# Patient Record
Sex: MALE | Race: WHITE | NOT HISPANIC OR LATINO | Employment: FULL TIME | URBAN - METROPOLITAN AREA
[De-identification: names, ages, dates, MRNs, and addresses within clinical notes are randomized per-mention and may not be internally consistent; named-entity substitution may affect disease eponyms.]

---

## 2018-02-18 ENCOUNTER — OFFICE VISIT - HEALTHEAST (OUTPATIENT)
Dept: FAMILY MEDICINE | Facility: CLINIC | Age: 28
End: 2018-02-18

## 2018-02-18 DIAGNOSIS — J02.0 STREP PHARYNGITIS: ICD-10-CM

## 2018-02-18 DIAGNOSIS — R07.0 THROAT PAIN: ICD-10-CM

## 2018-02-18 DIAGNOSIS — J10.1 INFLUENZA B: ICD-10-CM

## 2018-02-18 DIAGNOSIS — R50.9 FEVER: ICD-10-CM

## 2018-02-18 DIAGNOSIS — K29.70 GASTRITIS: ICD-10-CM

## 2018-02-18 LAB
DEPRECATED S PYO AG THROAT QL EIA: ABNORMAL
FLUAV AG SPEC QL IA: ABNORMAL
FLUBV AG SPEC QL IA: ABNORMAL

## 2018-02-18 RX ORDER — ACETAMINOPHEN 500 MG
500 TABLET ORAL EVERY 6 HOURS PRN
Status: SHIPPED | COMMUNITY
Start: 2018-02-18 | End: 2023-12-23

## 2018-02-18 RX ORDER — IBUPROFEN 200 MG
200 TABLET ORAL EVERY 6 HOURS PRN
Status: SHIPPED | COMMUNITY
Start: 2018-02-18 | End: 2023-12-23

## 2018-02-19 ENCOUNTER — AMBULATORY - HEALTHEAST (OUTPATIENT)
Dept: LAB | Facility: CLINIC | Age: 28
End: 2018-02-19

## 2018-02-19 DIAGNOSIS — K29.70 GASTRITIS: ICD-10-CM

## 2018-02-20 ENCOUNTER — COMMUNICATION - HEALTHEAST (OUTPATIENT)
Dept: FAMILY MEDICINE | Facility: CLINIC | Age: 28
End: 2018-02-20

## 2018-02-20 LAB
H PYLORI AG STL QL IA: NORMAL
REPORT STATUS: NORMAL
SPECIMEN DESCRIPTION: NORMAL

## 2018-02-21 ENCOUNTER — COMMUNICATION - HEALTHEAST (OUTPATIENT)
Dept: FAMILY MEDICINE | Facility: CLINIC | Age: 28
End: 2018-02-21

## 2018-04-18 ENCOUNTER — OFFICE VISIT - HEALTHEAST (OUTPATIENT)
Dept: FAMILY MEDICINE | Facility: CLINIC | Age: 28
End: 2018-04-18

## 2018-04-18 DIAGNOSIS — A04.8 H. PYLORI INFECTION: ICD-10-CM

## 2018-04-18 DIAGNOSIS — F41.8 DEPRESSION WITH ANXIETY: ICD-10-CM

## 2018-04-18 DIAGNOSIS — F43.10 PTSD (POST-TRAUMATIC STRESS DISORDER): ICD-10-CM

## 2018-04-18 DIAGNOSIS — R10.9 ABDOMINAL PAIN: ICD-10-CM

## 2018-06-07 ENCOUNTER — OFFICE VISIT - HEALTHEAST (OUTPATIENT)
Dept: FAMILY MEDICINE | Facility: CLINIC | Age: 28
End: 2018-06-07

## 2018-06-07 DIAGNOSIS — J10.1 INFLUENZA B: ICD-10-CM

## 2018-06-07 DIAGNOSIS — R11.0 NAUSEA: ICD-10-CM

## 2018-06-07 DIAGNOSIS — R50.9 FEVER: ICD-10-CM

## 2018-06-07 DIAGNOSIS — J02.0 STREP PHARYNGITIS: ICD-10-CM

## 2018-06-07 DIAGNOSIS — R07.0 THROAT PAIN: ICD-10-CM

## 2018-06-07 RX ORDER — ONDANSETRON 4 MG/1
4 TABLET, ORALLY DISINTEGRATING ORAL EVERY 8 HOURS PRN
Qty: 10 TABLET | Refills: 0 | Status: SHIPPED | OUTPATIENT
Start: 2018-06-07 | End: 2023-12-23

## 2018-12-11 ENCOUNTER — COMMUNICATION - HEALTHEAST (OUTPATIENT)
Dept: LAB | Facility: CLINIC | Age: 28
End: 2018-12-11

## 2018-12-11 ENCOUNTER — OFFICE VISIT - HEALTHEAST (OUTPATIENT)
Dept: FAMILY MEDICINE | Facility: CLINIC | Age: 28
End: 2018-12-11

## 2018-12-11 DIAGNOSIS — R10.32 LLQ ABDOMINAL PAIN: ICD-10-CM

## 2018-12-11 DIAGNOSIS — R30.9 PAIN WITH URINATION: ICD-10-CM

## 2018-12-11 DIAGNOSIS — R35.0 URINARY FREQUENCY: ICD-10-CM

## 2018-12-11 LAB
ALBUMIN UR-MCNC: NEGATIVE MG/DL
APPEARANCE UR: CLEAR
BACTERIA #/AREA URNS HPF: NORMAL HPF
BILIRUB UR QL STRIP: NEGATIVE
COLOR UR AUTO: YELLOW
GLUCOSE UR STRIP-MCNC: NEGATIVE MG/DL
HGB UR QL STRIP: NEGATIVE
KETONES UR STRIP-MCNC: NEGATIVE MG/DL
LEUKOCYTE ESTERASE UR QL STRIP: NEGATIVE
NITRATE UR QL: NEGATIVE
PH UR STRIP: 6 [PH] (ref 5–8)
RBC #/AREA URNS AUTO: NORMAL HPF
SP GR UR STRIP: >=1.03 (ref 1–1.03)
SQUAMOUS #/AREA URNS AUTO: NORMAL LPF
UROBILINOGEN UR STRIP-ACNC: NORMAL
WBC #/AREA URNS AUTO: NORMAL HPF

## 2018-12-12 ENCOUNTER — COMMUNICATION - HEALTHEAST (OUTPATIENT)
Dept: LAB | Facility: CLINIC | Age: 28
End: 2018-12-12

## 2018-12-17 ENCOUNTER — HOSPITAL ENCOUNTER (OUTPATIENT)
Dept: CT IMAGING | Facility: CLINIC | Age: 28
Discharge: HOME | End: 2018-12-17

## 2018-12-17 DIAGNOSIS — R10.32 LLQ ABDOMINAL PAIN: ICD-10-CM

## 2018-12-18 ENCOUNTER — RECORDS - HEALTHEAST (OUTPATIENT)
Dept: ADMINISTRATIVE | Facility: OTHER | Age: 28
End: 2018-12-18

## 2018-12-18 ENCOUNTER — COMMUNICATION - HEALTHEAST (OUTPATIENT)
Dept: FAMILY MEDICINE | Facility: CLINIC | Age: 28
End: 2018-12-18

## 2021-06-01 VITALS — WEIGHT: 172 LBS | BODY MASS INDEX: 24.33 KG/M2

## 2021-06-01 VITALS — WEIGHT: 172.2 LBS | BODY MASS INDEX: 24.36 KG/M2

## 2021-06-01 VITALS — BODY MASS INDEX: 23.53 KG/M2 | WEIGHT: 166.38 LBS

## 2021-06-16 PROBLEM — F41.8 DEPRESSION WITH ANXIETY: Status: ACTIVE | Noted: 2018-04-18

## 2021-06-16 PROBLEM — F43.10 PTSD (POST-TRAUMATIC STRESS DISORDER): Status: ACTIVE | Noted: 2018-04-18

## 2021-06-16 PROBLEM — A04.8 H. PYLORI INFECTION: Status: ACTIVE | Noted: 2018-04-18

## 2021-06-17 NOTE — PROGRESS NOTES
Assessment/Plan:        1. Depression with anxiety  2. PTSD (post-traumatic stress disorder)  - Ambulatory referral to Psychology    3. H. pylori infection  S/p treatment,   4. Abdominal pain  - Ambulatory referral to Gastroenterology           Subjective:    Patient ID:   Tim Santoyo is a 27 y.o. male comes in for follow-up evaluation of anxiety ongoing for years and has been on various meds prescribed in his teen years to include:  Fluoxetine   paroxetin  cymbalta  Seroquel.   prozac   He is been on Effexor for the longest.   He believes that the low-dose sedative meds were helping him enough to care of his anxiety and Ativan as being the most effective treatment  He feels to be more anxious and denies being much depressed and been having panic attacks, and getting the shakes.  Has been to the past psychiatric consultations  Life stressors have been contributing factor to his anxiety.    Other concerns  Ongoing abdominal pain with status post treatment for H. Pylori  He would like a referral to gastroenterology        allergies, current medications, past family history, past medical history, past social history, past surgical history and problem list.    Review of Systems  A complete 10 point review of systems was obtained and is negative other than what is stated in the HPI.           Objective:   /58 (Patient Site: Right Arm, Patient Position: Sitting, Cuff Size: Adult Regular)  Pulse 72  Temp 98.5  F (36.9  C) (Oral)   Wt 172 lb 3.2 oz (78.1 kg)  SpO2 97%  BMI 24.36 kg/m2      Physical Exam  General Appearance:    Alert, cooperative, no distress,    Throat:   Lips, mucosa, and tongue normal; teeth and gums normal   Neck:   Supple, symmetrical, trachea midline, no adenopathy;     thyroid:  no enlargement/tenderness/nodules;    Lungs:     Clear to auscultation bilaterally, respirations unlabored    Heart:    Regular rate and rhythm, S1 and S2 normal, no murmur, rub   or gallop   Abdomen:  Soft,  nontender, nondistended, normoactive bowel sounds with no rebound or guarding no palpable mass   Skin:   Skin color, texture, turgor normal, no rashes or lesions                Psych:  Mental status: Alert, oriented, thought content appropriate,                                              affect: normal, thought content exhibits logical connections

## 2021-06-22 NOTE — PROGRESS NOTES
Assessment/Plan:   1. Urinary frequency  2. Pain with urination  Negative urinalysis, patient refused STD testing.  - Urinalysis-UC if Indicated    3. LLQ abdominal pain  Differentials includes diverticulosis, diverticulitis,   - CT Abdomen Pelvis With Oral With IV Contrast; Future     2. Maintain adequate hydration  3. Follow up if symptoms not improving, and prn.     Subjective:   Tim Santoyo is a 28 y.o. male who complains of burning with urination and frequency for 4 days.  Patient also complains of abdominal pain. Patient denies back pain, congestion, cough, fever and headache.  Patient does not have a history of recurrent UTI.  Patient does not have a history of pyelonephritis.  The following portions of the patient's history were reviewed and updated as appropriate: allergies, current medications, past family history, past medical history, past social history, past surgical history and problem list.  Review of Systems  Pertinent items are noted in HPI.      Objective:      There were no vitals taken for this visit.  General: alert, appears stated age and cooperative   Abdomen: soft, non-tender, without masses or organomegaly, soft and tenderness moderate in the LLQ in the LLQ   Back: back muscles are full ROM   : defer exam     Laboratory:   Urine dipstick shows negative for all components.  .

## 2021-06-26 NOTE — PROGRESS NOTES
Progress Notes by Jose Brenner PA-C at 6/7/2018  8:30 AM     Author: Jose Brenner PA-C Service: -- Author Type: Physician Assistant    Filed: 6/7/2018 10:56 AM Encounter Date: 6/7/2018 Status: Signed    : Jose Brenner PA-C (Physician Assistant)          Assessment and Plan     Tim was seen today for poss flu.    Diagnoses and all orders for this visit:    Influenza B    Strep pharyngitis  -     amoxicillin (AMOXIL) 500 MG tablet; Take 1 tablet (500 mg total) by mouth 2 (two) times a day for 10 days.    Throat pain  -     Rapid Strep A Screen-Throat    Fever  -     Influenza A/B Rapid Test    Nausea  -     ondansetron disintegrating tablet 4 mg (ZOFRAN-ODT); Take 1 tablet (4 mg total) by mouth once.  -     ondansetron (ZOFRAN-ODT) 4 MG disintegrating tablet; Take 1 tablet (4 mg total) by mouth every 8 (eight) hours as needed for nausea.         HPI     Chief Complaint   Patient presents with   ? poss flu     1x day, admit fever, and nausea, sore throat . pt took 1000mg of tyelonl 2x hours ago. pt would like a doctor note for today visit        Tim Santoyo is a 27 y.o. male seen today for about 12 hours of fatigue, sore throat, odynophagia, nausea, myalgias, arthralgias, rhinorrhea, and nasal congestion. Denies CP, cough, dyspnea, abdominal pain. No rashes.       Current Outpatient Prescriptions:   ?  acetaminophen (TYLENOL) 500 MG tablet, Take 500 mg by mouth every 6 (six) hours as needed for pain., Disp: , Rfl:   ?  ibuprofen (ADVIL,MOTRIN) 200 MG tablet, Take 200 mg by mouth every 6 (six) hours as needed for pain., Disp: , Rfl:   ?  amoxicillin (AMOXIL) 500 MG tablet, Take 1 tablet (500 mg total) by mouth 2 (two) times a day for 10 days., Disp: 20 tablet, Rfl: 0  ?  ondansetron (ZOFRAN-ODT) 4 MG disintegrating tablet, Take 1 tablet (4 mg total) by mouth every 8 (eight) hours as needed for nausea., Disp: 10 tablet, Rfl: 0  No current facility-administered medications for this  visit.      Reviewed and updated: medical history, medications and allergies.     Review of Systems     General: Approximately 12 hours of fatigue, fever.  Cardiovascular: Denies chest pain, dyspnea on exertion, palpitations.  Respiratory: Acknowledges rhinorrhea, cough.  Denies dyspnea or wheezing.  GI: Acknowledges nausea without vomiting.  No diarrhea or constipation..  : Denies dysuria, polyuria.     Objective     Vitals:    06/07/18 0833   BP: 112/64   Pulse: 96   Resp: 14   Temp: (!) 102  F (38.9  C)   TempSrc: Oral   SpO2: 97%   Weight: 172 lb (78 kg)        Reviewed vital signs.  General: Appears calm, comfortable. Answers questions quickly and appropriately with clear speech. No apparent distress.  Skin: Pink, warm, dry.  HENT: Normocephalic, atraumatic. TMs and canals clear bilaterally. No lymphadenopathy.  Posterior oropharynx is erythematous without tonsillar hypertrophy or exudate.  Uvula is midline.  There is no peritonsillar or retropharyngeal swelling.  Neck: Supple, with tender anterior lymphadenopathy and without thyromegaly.  Cardiovascular: Regular rate and rhythm, clear S1/S2 without murmur, rub, or gallop.  Respiratory: Lung sounds are clear and equal bilaterally. Normal respiratory effort.  GI: Abdomen is soft, flat, nontender.  One episode of dry heaves during the visit today.  Neuro: Memory and cognition appear normal. Normal gait.  Psych: Mood and affect appear normal.     Results for orders placed or performed in visit on 06/07/18   Rapid Strep A Screen-Throat   Result Value Ref Range    Rapid Strep A Antigen Group A Strep detected (!) No Group A Strep detected, presumptive negative   Influenza A/B Rapid Test   Result Value Ref Range    Influenza  A, Rapid Antigen No Influenza A antigen detected No Influenza A antigen detected    Influenza B, Rapid Antigen Influenza B antigen detected (!) No Influenza B antigen detected          Medical Decision-Making     Tim is a fatigued appearing  27-year-old male who presents with approximately 12 hours of fever, chills, myalgias, nausea, and sore throat.  He is febrile and appropriately somewhat tachycardic.  He is not tachypneic or hypoxic.  Physical exam is remarkable for an erythematous posterior oropharynx, tender anterior lymphadenopathy.  Physical exam is otherwise unremarkable.  Rapid strep test was positive and rapid influenza testing was positive for influenza B.  Discussed the efficacy of oseltamivir along with side effects, he declined oseltamivir therapy which I believe is appropriate given that he is not in a high risk category.  Prescribed 10 days of amoxicillin for the strep pharyngitis.    Reviewed red flags that would trigger a prompt return to the clinic as noted below under patient instructions.  He expressed understanding of these directions and is in agreement with the plan.     Patient Instructions     Patient Instructions       The Flu (Influenza)     The virus that causes the flu spreads through the air in droplets when someone who has the flu coughs, sneezes, laughs, or talks.   The flu (influenza) is an infection that affects your respiratory tract. This tract is made up of your mouth, nose, and lungs, and the passages between them. Unlike a cold, the flu can make you very ill. And it can lead to pneumonia, a serious lung infection. The flu can have serious complications and even cause death.  Who is at risk for the flu?  Anyone can get the flu. But you are more likely to become infected if you:    Have a weakened immune system    Work in a healthcare setting where you may be exposed to flu germs    Live or work with someone who has the flu    Havent had an annual flu shot  How does the flu spread?  The flu is caused by a virus. The virus spreads through the air in droplets when someone who has the flu coughs, sneezes, laughs, or talks. You can become infected when you inhale these viruses directly. You can also become infected  when you touch a surface on which the droplets have landed and then transfer the germs to your eyes, nose, or mouth. Touching used tissues, or sharing utensils, drinking glasses, or a toothbrush from an infected person can expose you to flu viruses, too.  What are the symptoms of the flu?  Flu symptoms tend to come on quickly and may last a few days to a few weeks. They include:    Fever usually higher than 100.4 F  (38 C) and chills    Sore throat and headache    Dry cough    Runny nose    Tiredness and weakness    Muscle aches  Who is at risk for flu complications?  For some people, the flu can be very serious. The risk for complications is greater for:    Children younger than age 5    Adults ages 65 and older    People with a chronic illness such as diabetes or heart, kidney, or lung disease    People who live in a nursing home or long-term care facility   How is the flu treated?  The flu usually gets better after 7 days or so. In some cases, your healthcare provider may prescribe an antiviral medicine. This may help you get well a little sooner. For the medicine to help, you need to take it as soon as possible (ideally within 48 hours) after your symptoms start. If you develop pneumonia or other serious illness, you may need to stay in the hospital.  Easing flu symptoms    Drink lots of fluids such as water, juice, and warm soup. A good rule is to drink enough so that you urinate your normal amount.    Get plenty of rest.    Ask your healthcare provider what to take for fever and pain.    Call your provider if your fever is 100.4 F (38 C) or higher, or you become dizzy, lightheaded, or short of breath.  Taking steps to protect others    Wash your hands often, especially after coughing or sneezing. Or clean your hands with an alcohol-based hand  containing at least 60% alcohol.    Cough or sneeze into a tissue. Then throw the tissue away and wash your hands. If you dont have a tissue, cough and sneeze  into your elbow.    Stay home until at least 24 hours after you no longer have a fever or chills. Be sure the fever isnt being hidden by fever-reducing medicine.    Dont share food, utensils, drinking glasses, or a toothbrush with others.    Ask your healthcare provider if others in your household should get antiviral medicine to help them avoid infection.  How can the flu be prevented?    One of the best ways to avoid the flu is to get a flu vaccine each year. The virus that causes the flu changes from year to year. For that reason, healthcare providers recommend getting the flu vaccine each year, as soon as it's available in your area. The vaccine is given as a shot. Your healthcare provider can tell you which vaccine is right for you. The nasal spray is not recommended for the 6820-5381 flu season. The CDC says the nasal spray did not seem to protect against the flu over the last several flu seasons.    Wash your hands often. Frequent handwashing is a proven way to help prevent infection.    Carry an alcohol-based hand gel containing at least 60% alcohol. Use it when you can't use soap and water. Then wash your hands as soon as you can.    Avoid touching your eyes, nose, and mouth.    At home and work, clean phones, computer keyboards, and toys often with disinfectant wipes.    If possible, avoid close contact with others who have the flu or symptoms of the flu.  Handwashing tips  Handwashing is one of the best ways to prevent many common infections. If you are caring for or visiting someone with the flu, wash your hands each time you enter and leave the room. Follow these steps:    Use warm water and plenty of soap. Rub your hands together well.    Clean the whole hand, including under your nails, between your fingers, and up the wrists.    Wash for at least 15 seconds.    Rinse, letting the water run down your fingers, not up your wrists.    Dry your hands well. Use a paper towel to turn off the faucet and open  the door.  Using alcohol-based hand   Alcohol-based hand  are also a good choice. Use them when you can't use soap and water. Follow these steps:    Squeeze about a tablespoon of gel into the palm of one hand.    Rub your hands together briskly, cleaning the backs of your hands, the palms, between your fingers, and up the wrists.    Rub until the gel is gone and your hands are completely dry.  Preventing the flu in healthcare settings  The flu is a special concern for people in hospitals and long-term care facilities. To help prevent the spread of flu, many hospitals and nursing homes take these steps:    Healthcare providers wash their hands or use an alcohol-based hand  before and after treating each patient.    People with the flu have private rooms and bathrooms or share a room with someone with the same infection.    People who are at high risk for the flu but don't have it are encouraged to get the flu and pneumonia vaccines.    All healthcare workers are encouraged or required to get flu shots.   Date Last Reviewed: 12/1/2016 2000-2017 [employer]. [address] [place] [address]. All rights reserved. This information is not intended as a substitute for professional medical care. Always follow your healthcare professional's instructions.        Pharyngitis: Strep (Confirmed)    You have had a positive test for strep throat. Strep throat is a contagious illness. It is spread by coughing, kissing or by touching others after touching your mouth or nose. Symptoms include throat pain that is worse with swallowing, aching all over, headache, and fever. It is treated with antibiotic medicine. This should help you start to feel better in 1 to 2 days.  Home care    Rest at home. Drink plenty of fluids to you won't get dehydrated.    No work or school for the first 2 days of taking the antibiotics. After this time, you will not be contagious. You can then return to  school or work if you are feeling better.     Take antibiotic medicine for the full 10 days, even if you feel better. This is very important to ensure the infection is treated. It is also important to prevent medicine-resistant germs from developing. If you were given an antibiotic shot, you don't need any more antibiotics.    You may use acetaminophen or ibuprofen to control pain or fever, unless another medicine was prescribed for this. Talk with your healthcare provider before taking these medicines if you have chronic liver or kidney disease. Also talk with your healthcare provider if you have had a stomach ulcer or GI bleeding.    Throat lozenges or sprays help reduce pain. Gargling with warm saltwater will also reduce throat pain. Dissolve 1/2 teaspoon of salt in 1 glass of warm water. This may be useful just before meals.     Soft foods are OK. Don't eat salty or spicy foods.  Follow-up care  Follow up with your healthcare provider or our staff if you don't get better over the next week.  When to seek medical advice  Call your healthcare provider right away if any of these occur:    Fever of 100.4 F (38 C) or higher, or as directed by your healthcare provider    New or worsening ear pain, sinus pain, or headache    Painful lumps in the back of neck    Stiff neck    Lymph nodes getting larger or becoming soft in the middle    You can't swallow liquids or you can't open your mouth wide because of throat pain    Signs of dehydration. These include very dark urine or no urine, sunken eyes, and dizziness.    Trouble breathing or noisy breathing    Muffled voice    Rash  Prevention  Here are steps you can take to help prevent an infection:    Keep good hand washing habits.    Dont have close contact with people who have sore throats, colds, or other upper respiratory infections.    Dont smoke, and stay away from secondhand smoke.  Date Last Reviewed: 11/1/2017 2000-2017 [employer]. [address] [place] [address]  [facility], [place] [address]. All rights reserved. This information is not intended as a substitute for professional medical care. Always follow your healthcare professional's instructions.            Discussed benefit vs risk of medications, dosing, side effects.  Patient was able to verbalize understanding.  After visit summary was provided for patient.     Alejandro Brenner PA-C

## 2021-06-26 NOTE — PROGRESS NOTES
Progress Notes by Keyon Anderson DO at 2/18/2018  9:00 AM     Author: Keyon Anderson DO Service: -- Author Type: Physician    Filed: 2/19/2018  7:36 AM Encounter Date: 2/18/2018 Status: Signed    : Keyon Anderson DO (Physician)       Chief Complaint   Patient presents with   ? Fever     highest at 102.5, last treated with ibuprofen this morning   ? Sore Throat     started today     History of Present Illness: Nursing notes reviewed. Patient started feeling ill yesterday morning, with sweating, fever, body aches, and headaches. Another concern is a burning sensation in his stomach every morning until he eats foot. He was treated for H. Pylori about 3 years ago, and felt better until the burning stomach sensation started about 2 years ago. Antacid like omeprazole helps the burning sensation temporarily. He drinks a lot of coffee and spicy foods. No recent darker or bloody stools.     Review of systems: See history of present illness, otherwise negative.     Current Outpatient Prescriptions   Medication Sig Dispense Refill   ? acetaminophen (TYLENOL) 500 MG tablet Take 500 mg by mouth every 6 (six) hours as needed for pain.     ? ibuprofen (ADVIL,MOTRIN) 200 MG tablet Take 200 mg by mouth every 6 (six) hours as needed for pain.     ? amoxicillin (AMOXIL) 500 MG capsule Take 1 capsule (500 mg total) by mouth 2 (two) times a day for 10 days. 20 capsule 0   ? oseltamivir (TAMIFLU) 75 MG capsule Take 1 capsule (75 mg total) by mouth 2 (two) times a day for 5 days. 10 capsule 0     No current facility-administered medications for this visit.        Past Medical History:   Diagnosis Date   ? GERD (gastroesophageal reflux disease)     Over 3 years with history of H. pylori   ? Seizures     ×1 with syncope after a blood draw      Past Surgical History:   Procedure Laterality Date   ? Jaw reconstruction  2008      Social History     Social History   ? Marital status: Single     Spouse name: N/A   ? Number of children:  1   ? Years of education: 12     Occupational History   ? [employer]     Social History Main Topics   ? Smoking status: Former Smoker     Quit date: 3/21/2016   ? Smokeless tobacco: Never Used   ? Alcohol use None   ? Drug use: None   ? Sexual activity: Not Asked     Other Topics Concern   ? None     FMHx: mom has history of HTM, Dad has history of H. Pylori infection. Maternal grandma had history of stomach cancer.    Social History Narrative       History   Smoking Status   ? Former Smoker   ? Quit date: 3/21/2016   Smokeless Tobacco   ? Never Used      Exam:   Blood pressure 114/70, pulse 73, temperature 98.7  F (37.1  C), temperature source Oral, resp. rate 18, weight 166 lb 6 oz (75.5 kg), SpO2 97 %.    EXAM:   General: Vital signs reviewed. Patient is in no acute appearing distress with no guarding of movement during exam. Breathing is non labored appearing. Patient is alert and oriented x 3.   ENT: Tympanic membranes are clear and without injection bilaterally, nasal turbinates show no injection or rhinorrhea, mild pharyngeal injection without exudate noted.  Eyes: Sclera are normal white color, normal consensual gaze, corneas are clear.  Neck: supple with no adenopathy.  Heart: Normal rate and rhythm without murmur  Lungs: Clear to auscultation with good air flow bilaterally.  Heart: Normal rate and rhythm without murmur  Lungs: Clear to auscultation with good air flow bilaterally.  Abdomen soft, non tender, no abnormal masses. Normal bowel sounds.  Skin: warm and dry with no edema noted.  Neuro: No focal deficits noted.    Recent Results (from the past 24 hour(s))   Rapid Strep A Screen-Throat   Result Value Ref Range    Rapid Strep A Antigen Group A Strep detected (!) No Group A Strep detected, presumptive negative   Influenza A/B Rapid Test   Result Value Ref Range    Influenza  A, Rapid Antigen No Influenza A antigen detected No Influenza A antigen detected    Influenza B,  Rapid Antigen Influenza B antigen detected (!) No Influenza B antigen detected    Results from exam reviewed with patient.    Assessment/Plan   1. Gastritis  H. pylori Antigen, Stool    CANCELED: H. pylori Antigen, Stool   2. Fever  Rapid Strep A Screen-Throat    Influenza A/B Rapid Test   3. Throat pain     4. Influenza B  oseltamivir (TAMIFLU) 75 MG capsule   5. Strep pharyngitis  amoxicillin (AMOXIL) 500 MG capsule       Patient Instructions     Also see info below. We will notify you of the results of studies not known at time of exam, and treat appropriately. Be seen again in 2-3 days if symptoms are not better, sooner if feeling any worse. If the H. Pylori test is negative, further discussion about the cause of your stomach discomfort should be done through primary provider.    Understanding Gastritis    Gastritis is a painful inflammation of the stomach lining. It has a number of causes. Gastritis and its symptoms can be relieved with treatment. Work with your healthcare provider to find ways to treat your symptoms.  The Stomach  To digest the food you eat, your stomach makes strong acids and enzymes. A healthy stomach has built-in defenses that protect its lining from damage by these acids and enzymes.  When you have gastritis  Acids may damage the stomach lining when the built-in defenses of the stomach dont function as they should. The stomach lining can then become inflamed. When this happens, it is called gastritis.  Causes of gastritis  Gastritis has many causes. They may include:    Aspirin and anti-inflammatory medicines    Tobacco use    Alcohol use    Helicobacter pylori (H. pylori) bacteria    Trauma from injuries, burns, or major surgery    Critical illness or autoimmune disorders  Common symptoms  With gastritis, you may notice one or more of the following:    A burning feeling in your upper belly    Pain that happens after eating certain foods    Gas or a bloated feeling in your  stomach    Frequent belching    Nausea with or without vomiting    Loss of appetite    Feeling full quickly    Fatigue  Date Last Reviewed: 7/1/2016 2000-2016 [employer]. [address] [place] [address], [place] [address]. All rights reserved. This information is not intended as a substitute for professional medical care. Always follow your healthcare professional's instructions.        Treating Gastritis     Take your medicines as directed, even if your stomach pain goes away.    A medical evaluation will be done to find out the cause of your symptoms. The evaluation may include your health history, a physical exam, and some tests. Once your evaluation is done, treatment can begin. It may include taking certain medicines and making some lifestyle changes. Follow your healthcare providers advice.  Taking medicines  Your healthcare providers may prescribe some medicines to neutralize or reduce excess stomach acids. If tests show that H. pylori are in your stomach lining, antibiotics may be prescribed. H.pylori are a type of bacteria that can cause gastritis.  Avoiding certain things  Be sure to avoid:    Aspirin. Avoid taking aspirin and other anti-inflammatory medicines, such as ibuprofen. They can irritate your stomach lining. Also, check with your healthcare provider before taking or stopping any medicines.    Spicy foods and caffeine. Stay away from foods prepared with spices, especially black pepper. Caffeine can also make your symptoms worse. So, avoid coffee, tea, cola drinks, and chocolate. Be sure to tell your healthcare provider about any other foods or liquids that bother your stomach.    Tobacco and alcohol. Dont use tobacco or drink alcohol. Tobacco and alcohol can increase stomach acids and worsen your gastritis symptoms.  Reducing your stress  Stress may make your gastritis symptoms worse. Whenever you can, reduce the stress in your life. One way to do this is to start an exercise  program--talk to your healthcare provider first. Also, try to get enough sleep, at least 8 hours a night.  Date Last Reviewed: 7/1/2016 2000-2016 [employer]. [address] [place] [address], [place] [address]. All rights reserved. This information is not intended as a substitute for professional medical care. Always follow your healthcare professional's instructions.        Influenza     Viruses that cause influenza spread through the air in droplets when someone who has the flu coughs, sneezes, laughs, or talks.   Influenza (the flu) is an infection that affects your respiratory tract. This tract is made up of your mouth, nose, and lungs, and the passages between them. Unlike a cold, the flu can make you very ill. And it can lead to pneumonia, a serious lung infection. The flu can have serious complications and even be fatal for some people. These include older adults, young children, and people with certain chronic conditions.  Who is at risk for the flu?  Anyone can get the flu. But you are more likely to become infected if you:    Have a weakened immune system    Work in a healthcare setting where you may be exposed to flu germs    Live or work with someone who has the flu    Havent had an annual flu shot  How does the flu spread?  The flu is caused by viruses. The viruses spread through the air in droplets when someone who has the flu coughs, sneezes, laughs, or talks. You can become infected when you inhale these viruses directly. You can also become infected when you touch a surface on which the droplets have landed and then transfer the germs to your eyes, nose, or mouth. Touching used tissues, or sharing utensils, drinking glasses, or a toothbrush with an infected person can expose you to flu viruses, too.  What are the symptoms of the flu?  Flu symptoms tend to come on quickly and may last a few days to a few weeks. They include:    Fever usually higher than 100.4 F  (38 C) and chills    Sore  throat and headache    Dry cough    Runny nose    Tiredness and weakness    Muscle aches  Things that make the flu worse  For some people, the flu can be very serious. The risk for complications is greater for:    Children younger than age 5    Adults ages 65 and older    People with a chronic illness such as diabetes or heart, kidney, or lung disease    People who live in a nursing home or long-term care facility   How is the flu treated?  The flu usually gets better after 7 days or so. In some cases, your healthcare provider may prescribe an antiviral medicine. This may help you get well sooner. For the medicine to help, you need to take it as soon as possible (ideally within 48 hours) after your symptoms start. If you develop pneumonia or other serious illness, you may need to stay in the hospital.  Easing flu symptoms    Drink lots of fluids such as water, juice, and warm soup. A good rule is to drink enough so that you urinate your normal amount.    Get plenty of rest.    Ask your healthcare provider what to take for fever and pain.    Call your provider if your fever is 100.4 F (38 C) or higher, or you become dizzy, lightheaded, or short of breath.  Taking steps to protect others    Wash your hands often, especially after coughing or sneezing. Or clean your hands with an alcohol-based hand  containing at least 60% alcohol.    Cough or sneeze into a tissue. Then throw the tissue away and wash your hands. If you dont have a tissue, cough and sneeze into the crook of your elbow.    Stay home until at least 24 hours after you no longer have a fever or chills. Be sure the fever isnt being hidden by fever-reducing medicine.    Dont share food, utensils, drinking glasses, or a toothbrush with others.    Ask your healthcare provider if others in your household should get antiviral medicine to help them avoid infection.  How can the flu be prevented?    One of the best ways to avoid the flu is to get a flu  vaccine each year. Viruses that cause the flu change from year to year. For that reason, doctors recommend getting the flu vaccine each year, as soon as it's available in your area. The vaccine may be given as a shot or as a nasal spray. Your healthcare provider can tell you which vaccine is right for you. The nasal spray is not recommended for the 3050-7126 flu season. The CDC says this is because the nasal spray did not seem to protect against the flu over the last several flu seasons. In the past, it was meant for people ages 2 to 49.    Wash your hands often. Frequent handwashing is a proven way to help prevent infection.    Carry an alcohol-based hand gel containing at least 60% alcohol. Use it when you can't use soap and water. Then wash your hands as soon as you can.    Avoid touching your eyes, nose, and mouth.    At home and work, clean phones, computer keyboards, and toys often with disinfectant wipes.    If possible, avoid close contact with others who have the flu or symptoms of the flu.  Handwashing tips  Handwashing is one of the best ways to prevent many common infections. If you are caring for or visiting someone with the flu, wash your hands each time you enter and leave the room. Follow these steps:    Use warm water and plenty of soap. Rub your hands together well.    Clean the whole hand, under your nails, between your fingers, and up the wrists.    Wash for at least 15 seconds.    Rinse, letting the water run down your fingers, not up your wrists.    Dry your hands well. Use a paper towel to turn off the faucet and open the door.  Using alcohol-based hand   Alcohol-based hand  are also a good choice. Use them when you can't use soap and water. Follow these steps:    Squeeze about a tablespoon of gel into the palm of one hand.    Rub your hands together briskly, cleaning the backs of your hands, the palms, between your fingers, and up the wrists.    Rub until the gel is gone and  your hands are completely dry.  Preventing influenza in healthcare settings  The flu is a special concern for people in hospitals and long-term care facilities. To help prevent the spread of flu, many hospitals and nursing homes take these steps:    Healthcare providers wash their hands or use an alcohol-based hand  before and after treating each patient.    People with the flu have private rooms and bathrooms or share a room with someone with the same infection.    People at high-risk for the flu but don't have it are encouraged to get the flu and pneumonia vaccines.    All healthcare workers are encouraged or required to get flu shots.   Date Last Reviewed: 8/27/2014 2000-2016 [employer]. [address] [place] [address], [place] [address]. All rights reserved. This information is not intended as a substitute for professional medical care. Always follow your healthcare professional's instructions.        Self-Care for Sore Throats  Sore throats happen for many reasons, such as colds, allergies, and infections caused by viruses or bacteria. In any case, your throat becomes red and sore. Your goal for self-care is to reduce your discomfort while giving your throat a chance to heal.    Moisten and soothe your throat  Tips include the following:    Try a sip of water first thing after waking up.    Keep your throat moist by drinking 6 or more glasses of clear liquids every day.    Run a cool-air humidifier in your room overnight.    Avoid cigarette smoke.     Suck on throat lozenges, cough drops, hard candy, ice chips, or frozen fruit-juice bars. Use the sugar-free versions if your diet or medical condition requires them.  Gargle to ease irritation  Gargling every hour or 2 can ease irritation. Try gargling with 1 of these solutions:    1/4 teaspoon of salt in 1/2 cup of warm water    An over-the-counter anesthetic gargle  Use medicine for more relief  Over-the-counter medicine can reduce sore throat  symptoms. Ask your pharmacist if you have questions about which medicine to use:    Ease pain with anesthetic sprays. Aspirin or an aspirin substitute also helps. Remember, never give aspirin to anyone 18 or younger, or if you are already taking blood thinners.     For sore throats caused by allergies, try antihistamines to block the allergic reaction.    Remember: unless a sore throat is caused by a bacterial infection, antibiotics wont help you.  Prevent future sore throats  Prevention tips include the following:    Stop smoking or reduce contact with secondhand smoke. Smoke irritates the tender throat lining.    Limit contact with pets and with allergy-causing substances, such as pollen and mold.    When youre around someone with a sore throat or cold, wash your hands often to keep viruses or bacteria from spreading.    Dont strain your vocal cords.  Call your healthcare provider  Contact your healthcare provider if you have:    A temperature over 101 F (38.3 C)    White spots on the throat    Great difficulty swallowing    Trouble breathing    A skin rash    Recent exposure to someone else with strep bacteria    Severe hoarseness and swollen glands in the neck or jaw   Date Last Reviewed: 8/1/2016 2000-2016 [employer]. [address] [place] [address]. All rights reserved. This information is not intended as a substitute for professional medical care. Always follow your healthcare professional's instructions.           Keyon Anderson,

## 2021-12-13 ENCOUNTER — OFFICE VISIT (OUTPATIENT)
Dept: INTERNAL MEDICINE | Facility: CLINIC | Age: 31
End: 2021-12-13
Payer: COMMERCIAL

## 2021-12-13 VITALS
DIASTOLIC BLOOD PRESSURE: 70 MMHG | OXYGEN SATURATION: 99 % | SYSTOLIC BLOOD PRESSURE: 110 MMHG | HEIGHT: 71 IN | WEIGHT: 163.5 LBS | BODY MASS INDEX: 22.89 KG/M2 | HEART RATE: 90 BPM

## 2021-12-13 DIAGNOSIS — R10.13 ABDOMINAL PAIN, EPIGASTRIC: ICD-10-CM

## 2021-12-13 DIAGNOSIS — R68.82 LOW LIBIDO: ICD-10-CM

## 2021-12-13 DIAGNOSIS — R30.0 DYSURIA: ICD-10-CM

## 2021-12-13 DIAGNOSIS — A04.8 H. PYLORI INFECTION: ICD-10-CM

## 2021-12-13 DIAGNOSIS — F41.8 DEPRESSION WITH ANXIETY: Primary | ICD-10-CM

## 2021-12-13 DIAGNOSIS — F43.10 PTSD (POST-TRAUMATIC STRESS DISORDER): ICD-10-CM

## 2021-12-13 LAB
ALBUMIN UR-MCNC: NEGATIVE MG/DL
APPEARANCE UR: CLEAR
BILIRUB UR QL STRIP: ABNORMAL
COLOR UR AUTO: YELLOW
GLUCOSE UR STRIP-MCNC: NEGATIVE MG/DL
HGB UR QL STRIP: NEGATIVE
KETONES UR STRIP-MCNC: ABNORMAL MG/DL
LEUKOCYTE ESTERASE UR QL STRIP: NEGATIVE
NITRATE UR QL: NEGATIVE
PH UR STRIP: 5.5 [PH] (ref 5–7)
SP GR UR STRIP: >=1.03 (ref 1–1.03)
UROBILINOGEN UR STRIP-ACNC: 0.2 E.U./DL

## 2021-12-13 PROCEDURE — 87491 CHLMYD TRACH DNA AMP PROBE: CPT | Performed by: NURSE PRACTITIONER

## 2021-12-13 PROCEDURE — 81003 URINALYSIS AUTO W/O SCOPE: CPT | Performed by: NURSE PRACTITIONER

## 2021-12-13 PROCEDURE — 87591 N.GONORRHOEAE DNA AMP PROB: CPT | Performed by: NURSE PRACTITIONER

## 2021-12-13 PROCEDURE — 99204 OFFICE O/P NEW MOD 45 MIN: CPT | Performed by: NURSE PRACTITIONER

## 2021-12-13 PROCEDURE — 96127 BRIEF EMOTIONAL/BEHAV ASSMT: CPT | Performed by: NURSE PRACTITIONER

## 2021-12-13 RX ORDER — GABAPENTIN 100 MG/1
100 CAPSULE ORAL 3 TIMES DAILY PRN
Qty: 20 CAPSULE | Refills: 0 | Status: SHIPPED | OUTPATIENT
Start: 2021-12-13 | End: 2023-12-23

## 2021-12-13 ASSESSMENT — ANXIETY QUESTIONNAIRES
GAD7 TOTAL SCORE: 21
GAD7 TOTAL SCORE: 21
5. BEING SO RESTLESS THAT IT IS HARD TO SIT STILL: NEARLY EVERY DAY
2. NOT BEING ABLE TO STOP OR CONTROL WORRYING: NEARLY EVERY DAY
4. TROUBLE RELAXING: NEARLY EVERY DAY
GAD7 TOTAL SCORE: 21
7. FEELING AFRAID AS IF SOMETHING AWFUL MIGHT HAPPEN: NEARLY EVERY DAY
6. BECOMING EASILY ANNOYED OR IRRITABLE: NEARLY EVERY DAY
1. FEELING NERVOUS, ANXIOUS, OR ON EDGE: NEARLY EVERY DAY
7. FEELING AFRAID AS IF SOMETHING AWFUL MIGHT HAPPEN: NEARLY EVERY DAY
3. WORRYING TOO MUCH ABOUT DIFFERENT THINGS: NEARLY EVERY DAY

## 2021-12-13 ASSESSMENT — PATIENT HEALTH QUESTIONNAIRE - PHQ9
SUM OF ALL RESPONSES TO PHQ QUESTIONS 1-9: 16
10. IF YOU CHECKED OFF ANY PROBLEMS, HOW DIFFICULT HAVE THESE PROBLEMS MADE IT FOR YOU TO DO YOUR WORK, TAKE CARE OF THINGS AT HOME, OR GET ALONG WITH OTHER PEOPLE: EXTREMELY DIFFICULT
SUM OF ALL RESPONSES TO PHQ QUESTIONS 1-9: 16

## 2021-12-13 ASSESSMENT — ENCOUNTER SYMPTOMS: NERVOUS/ANXIOUS: 1

## 2021-12-13 ASSESSMENT — MIFFLIN-ST. JEOR: SCORE: 1710.82

## 2021-12-13 NOTE — PATIENT INSTRUCTIONS
-Mental Health referral placed; someone will call you within 3 days  -Mental Health crisis [phone]  -Gabapentin 1 tablet every 8 hours as needed for anxiety  -Follow up in 2-4 weeks

## 2021-12-13 NOTE — PROGRESS NOTES
Answers for HPI/ROS submitted by the patient on 12/13/2021  If you checked off any problems, how difficult have these problems made it for you to do your work, take care of things at home, or get along with other people?: Extremely difficult  PHQ9 TOTAL SCORE: 16  LINK 7 TOTAL SCORE: 21      Assessment & Plan     Depression with anxiety  - Here today to est care and discuss concern re: mood.  Per chart review, has tried multiple medications in the past.  Mood worse since he had a relationship dissolve.  Denies SI.  Has a hx of suicide attempts in the remote past, age 16, with psychiatric hospitalization.  - Referral to  for full evaluation and medication recommendations going forward, given complex hx  - Discussed trial of PRN's for panic/anxiety.  Declines vistaril- has at home; not helpful.  States ativan has been helpful- however, discussed I am not willing to prescribe this at this time- discussed trial of other medications that may provide long term symptoms relief.  Would defer this to Psychiatry.  Patient agreeable to referral  - Discussed trial of gabapentin for anxiety/panic. Pt states he has never had.   reviewed- no controlled meds per .  Will rx 100 mg, TID PRN; counseled on use- quant #20  - F/u 2-4 weeks  - Crisis info for [facility] provided  - Adult Mental Health Referral  - gabapentin (NEURONTIN) 100 MG capsule  Dispense: 20 capsule; Refill: 0  - TSH with free T4 reflex  - Comprehensive metabolic panel (BMP + Alb, Alk Phos, ALT, AST, Total. Bili, TP)  - CBC with platelets  - Comprehensive metabolic panel  - TSH with free T4 reflex  - CBC with platelets    PTSD (post-traumatic stress disorder)  - See above  - Adult Mental Health Referral    H. pylori infection  - Hx of; previously treated.  Endorses ongoing GI symptoms. No abd pain on exam.  Declines repeat testing; did not find PPI's helpful.  - Monitor    Abdominal pain, epigastric  - Longstanding hx; reports never recovering from H  Pylori; having persistent symptoms.  Per chart review, EGD completed in 2016- results noted, Normal.  Also had CT abd/pelvis in 2018 that was normal.  - Discussed retesting for H Pylori, however, patient endorses having had 3 neg tests previously.  Did not find PPI helpful.  Suspect mood contributing some.  Monitor for now; offered GI referral, endorses seeing GI mult times in past without other recs.      Low libido  - Requesting Viagra- declined request.  Discussed and recommended checking testosterone level and if this is low, treatment would be replacement.  Discussed concern re: mood contributing to symptoms.  - Testosterone, total  - Testosterone total    Dysuria  - C/o dysuria at end of visit.  - UA Macro with Reflex to Micro and Culture - lab collect  - Chlamydia & Gonorrhea by PCR, [facility] - Clinic Collect  - HIV Antigen Antibody Combo  - Treponema Abs w Reflex to RPR and Titer  - Hepatitis C Screen Reflex to HCV RNA Quant and Genotype  - UA Macro with Reflex to Micro and Culture - lab collect  - HIV Antigen Antibody Combo  - Treponema Abs w Reflex to RPR and Titer  - Hepatitis C Screen Reflex to HCV RNA Quant and Genotype    Depression Screening Follow Up    PHQ 12/13/2021   PHQ-9 Total Score 16   Q9: Thoughts of better off dead/self-harm past 2 weeks Not at all     Last PHQ-9 12/13/2021   1.  Little interest or pleasure in doing things 1   2.  Feeling down, depressed, or hopeless 1   3.  Trouble falling or staying asleep, or sleeping too much 3   4.  Feeling tired or having little energy 3   5.  Poor appetite or overeating 3   6.  Feeling bad about yourself 3   7.  Trouble concentrating 1   8.  Moving slowly or restless 1   Q9: Thoughts of better off dead/self-harm past 2 weeks 0   PHQ-9 Total Score 16       Follow Up Actions Taken  Crisis resource information provided in After Visit Summary  Mental Health Referral placed  Follow up recommended: 2-4 weeks; sooner if needed     See Patient  Instructions    Return for with me 2-4 weeks.    DIMPLE Britton CNP  [facility]    Subjective   Tim is a 31 year old who presents for the following health issues      Anxiety    History of Present Illness       Mental Health Follow-up:  Patient presents to follow-up on Depression & Anxiety.Patient's depression since last visit has been:  Worse  The patient is having other symptoms associated with depression.  Patient's anxiety since last visit has been:  Worse  The patient is having other symptoms associated with anxiety.  Any significant life events: relationship concerns, job concerns, housing concerns, grief or loss and health concerns  Patient is feeling anxious or having panic attacks.  Patient has no concerns about alcohol or drug use.     Social History  Tobacco Use    Smoking status: Former Smoker      Quit date: 3/21/2016      Years since quittin.7    Smokeless tobacco: Never Used  Alcohol use: Not on file  Drug use: Not on file      Today's PHQ-9         PHQ-9 Total Score:     (P) 16   PHQ-9 Q9 Thoughts of better off dead/self-harm past 2 weeks :   (P) Not at all   Thoughts of suicide or self harm:      Self-harm Plan:        Self-harm Action:          Safety concerns for self or others:           He eats 0-1 servings of fruits and vegetables daily.He consumes 3 sweetened beverage(s) daily.He exercises with enough effort to increase his heart rate 60 or more minutes per day.  He exercises with enough effort to increase his heart rate 7 days per week.   He is taking medications regularly.       Establish care  Depression  Anxiety:  Tim is a 31-year-old gentleman with a past medical history significant for anxiety, depression, and PTSD, who presents to clinic today to establish care and to address mental health concerns.      Patient reports longstanding history of mood disorder, namely depression and anxiety as well as PTSD, panic attacks, since age 16  "years old.  Patient reports that he has been on multiple medications in the past including Cymbalta, Effexor, fluoxetine, Lexapro, paroxetine, Seroquel, without significant improvement in his mood.  He reports that Effexor was the only medication that he took for the longest period of time, and notes that when he weaned off, this was very difficult.  He endorses that he has not done a great job at following through with staying on medication therapy and generally weans himself off.  Also reports that medications may have helped with his mood, but also made him \"not feel\".  He reports that his mood has not been good over the past 6 months, since he had had a long-term relationship that dissolved.  He reports that he has been struggling and this is the first time since age 19 that he is truly been on his own.  He reports having \"bad\" panic attacks.  He reports that he just has difficulty in getting things done due to his mood.  Endorses difficulty falling asleep at nighttime, but once he falls asleep he sleeps okay.  Also endorses low libido, erectile dysfunction, and difficulty eating.  Endorses weight loss.  Of note, patient states that he [facility] and was going through DBT about 4 months ago but stopped, endorsing challenges with his job and timing. Thinks this may have been helpful.      He reports having had several psychiatric hospital admissions in his past.  His first was at age 16 when he was living in [place].  He reports that he was a cutter, but did not have significant injury from cutting.  He also reports that the second time he was hospitalized he had taken a bunch of pills, and woke up, and was brought to the hospital at that time.  Reports history of [facility] time in 2009.  States this was a tough time and endorses some PTSD from that experience, but does not go into detail per his request. Currently denies suicidal ideation.  He is requesting help for his " anxiety, panic attacks, mood.  He reports that he has been on pretty much every different type of medication out there and is requesting medication that will not make him feel terribly sleepy as he states he has a hard time functioning in his job.  He does note that lorazepam prescribed in the past has been most helpful and wonders how I feel about that.    He is currently living in a house with other roommates, and states that they all live their own lives/do their own thing.  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  Does not really talk to his family.  Does not really have a support system.     Depression and Anxiety Follow-Up    How are you doing with your depression since your last visit? Worsened     How are you doing with your anxiety since your last visit?  Worsened     Are you having other symptoms that might be associated with depression or anxiety? Yes:  See screenings    Have you had a significant life event? Relationship Concerns, Job Concerns, Financial Concerns, Housing Concerns, Transportation Concerns, Grief or Loss and Health Concerns Per patient response    Do you have any concerns with your use of alcohol or other drugs? No      GI concerns:  Reports a history of being treated for H. pylori in the past.  Reports that since that diagnosis his GI system has been messed up.  He has had a couple of endoscopies per his report that have been normal.  He endorses having testing on 3 different occasions after being treated for H. pylori, all being negative.  States he was prescribed omeprazole in the past but endorses this was not helpful and only made it difficult for him to eat and digest food due to a low acid content in his stomach.  Currently denies any abdominal pain, but endorses pain in his lower esophagus.  Endorses weight loss.  During our conversation he endorses that his GI symptoms may be related to his mental  health.    ED:  Requesting [facility] for ED.    Social History     Tobacco Use     Smoking status: Former Smoker     Quit date: 3/21/2016     Years since quittin.7     Smokeless tobacco: Never Used   Substance Use Topics     Alcohol use: Not on file     Drug use: Not on file     PHQ 2021   PHQ-9 Total Score 16   Q9: Thoughts of better off dead/self-harm past 2 weeks Not at all     LINK-7 SCORE 2021   Total Score 21 (severe anxiety)   Total Score 21     Last PHQ-9 2021   1.  Little interest or pleasure in doing things 1   2.  Feeling down, depressed, or hopeless 1   3.  Trouble falling or staying asleep, or sleeping too much 3   4.  Feeling tired or having little energy 3   5.  Poor appetite or overeating 3   6.  Feeling bad about yourself 3   7.  Trouble concentrating 1   8.  Moving slowly or restless 1   Q9: Thoughts of better off dead/self-harm past 2 weeks 0   PHQ-9 Total Score 16     LINK-7  2021   1. Feeling nervous, anxious, or on edge 3   2. Not being able to stop or control worrying 3   3. Worrying too much about different things 3   4. Trouble relaxing 3   5. Being so restless that it is hard to sit still 3   6. Becoming easily annoyed or irritable 3   7. Feeling afraid, as if something awful might happen 3   LINK-7 Total Score 21       Suicide Assessment Five-step Evaluation and Treatment (SAFE-T)      Concern - stomach issues  Onset: chronic-intermittent  Description: loss of appetite  Intensity: severe-cannot eat anything-weight loss about 20# since spring  Progression of Symptoms:  same and constant  Accompanying Signs & Symptoms: see PHQ and LINK  Previous history of similar problem: none  Precipitating factors:        Worsened by: severe anxiety  Alleviating factors:        Improved by: none  Therapies tried and outcome: None    Review of Systems   Psychiatric/Behavioral: The patient is nervous/anxious.       CONSTITUTIONAL:POSITIVE  for weight loss and NEGATIVE  for chills and  "fever  ENT/MOUTH: NEGATIVE for ear, mouth and throat problems  RESP:NEGATIVE for significant cough or SOB  CV: NEGATIVE for chest pain, palpitations or peripheral edema  GI: POSITIVE for GERD, denies abd pain; hx H Pylori  : positive for, dysuria and erectile dysfunction  MUSCULOSKELETAL: NEGATIVE for significant arthralgias or myalgia  PSYCHIATRIC: POSITIVE foranxiety and depressed mood, panic attacks      Objective    /70   Pulse 90   Ht 1.791 m (5' 10.5\")   Wt 74.2 kg (163 lb 8 oz)   SpO2 99%   BMI 23.13 kg/m    Body mass index is 23.13 kg/m .     Physical Exam   GENERAL: healthy, alert, anxious  EYES: Eyes grossly normal to inspection, PERRL and conjunctivae and sclerae normal  HENT: ear canals and TM's normal, nose and mouth without ulcers or lesions  NECK: no adenopathy, no asymmetry, masses, or scars and thyroid normal to palpation  RESP: lungs clear to auscultation - no rales, rhonchi or wheezes  CV: regular rate and rhythm, normal S1 S2, no S3 or S4, no murmur, click or rub, no peripheral edema and peripheral pulses strong  ABDOMEN: soft, nontender, no hepatosplenomegaly, no masses and bowel sounds normal  MS: no gross musculoskeletal defects noted, no edema  SKIN: no suspicious lesions or rashes  NEURO: Normal strength and tone, mentation intact and speech normal  PSYCH: mentation anxious, tearful at times, irritable    -Reviewed PCP notes from prior clinic        "

## 2021-12-14 LAB
C TRACH DNA SPEC QL PROBE+SIG AMP: NEGATIVE
N GONORRHOEA DNA SPEC QL NAA+PROBE: NEGATIVE

## 2021-12-14 ASSESSMENT — ANXIETY QUESTIONNAIRES: GAD7 TOTAL SCORE: 21

## 2021-12-14 ASSESSMENT — PATIENT HEALTH QUESTIONNAIRE - PHQ9: SUM OF ALL RESPONSES TO PHQ QUESTIONS 1-9: 16

## 2021-12-31 DIAGNOSIS — N52.9 ERECTILE DYSFUNCTION, UNSPECIFIED ERECTILE DYSFUNCTION TYPE: Primary | ICD-10-CM

## 2022-02-06 ENCOUNTER — HEALTH MAINTENANCE LETTER (OUTPATIENT)
Age: 32
End: 2022-02-06

## 2022-10-01 ENCOUNTER — HEALTH MAINTENANCE LETTER (OUTPATIENT)
Age: 32
End: 2022-10-01

## 2023-05-14 ENCOUNTER — HEALTH MAINTENANCE LETTER (OUTPATIENT)
Age: 33
End: 2023-05-14

## 2023-12-23 ENCOUNTER — OFFICE VISIT (OUTPATIENT)
Dept: URGENT CARE | Facility: URGENT CARE | Age: 33
End: 2023-12-23
Payer: COMMERCIAL

## 2023-12-23 VITALS
OXYGEN SATURATION: 100 % | BODY MASS INDEX: 23.06 KG/M2 | WEIGHT: 163 LBS | HEART RATE: 66 BPM | DIASTOLIC BLOOD PRESSURE: 72 MMHG | TEMPERATURE: 97.8 F | SYSTOLIC BLOOD PRESSURE: 113 MMHG

## 2023-12-23 DIAGNOSIS — R12 HEARTBURN: ICD-10-CM

## 2023-12-23 DIAGNOSIS — R10.12 LUQ ABDOMINAL PAIN: Primary | ICD-10-CM

## 2023-12-23 LAB
ALBUMIN SERPL BCG-MCNC: 4.6 G/DL (ref 3.5–5.2)
ALP SERPL-CCNC: 56 U/L (ref 40–150)
ALT SERPL W P-5'-P-CCNC: 18 U/L (ref 0–70)
ANION GAP SERPL CALCULATED.3IONS-SCNC: 11 MMOL/L (ref 7–15)
AST SERPL W P-5'-P-CCNC: 26 U/L (ref 0–45)
BASOPHILS # BLD AUTO: 0.1 10E3/UL (ref 0–0.2)
BASOPHILS NFR BLD AUTO: 1 %
BILIRUB SERPL-MCNC: 0.9 MG/DL
BUN SERPL-MCNC: 15.7 MG/DL (ref 6–20)
CALCIUM SERPL-MCNC: 9.5 MG/DL (ref 8.6–10)
CHLORIDE SERPL-SCNC: 104 MMOL/L (ref 98–107)
CREAT SERPL-MCNC: 0.88 MG/DL (ref 0.67–1.17)
CRP SERPL-MCNC: <3 MG/L
DEPRECATED HCO3 PLAS-SCNC: 26 MMOL/L (ref 22–29)
EGFRCR SERPLBLD CKD-EPI 2021: >90 ML/MIN/1.73M2
EOSINOPHIL # BLD AUTO: 0 10E3/UL (ref 0–0.7)
EOSINOPHIL NFR BLD AUTO: 1 %
ERYTHROCYTE [DISTWIDTH] IN BLOOD BY AUTOMATED COUNT: 12.7 % (ref 10–15)
GLUCOSE SERPL-MCNC: 105 MG/DL (ref 70–99)
HCT VFR BLD AUTO: 42.4 % (ref 40–53)
HGB BLD-MCNC: 14.3 G/DL (ref 13.3–17.7)
IMM GRANULOCYTES # BLD: 0 10E3/UL
IMM GRANULOCYTES NFR BLD: 0 %
LIPASE SERPL-CCNC: 25 U/L (ref 13–60)
LYMPHOCYTES # BLD AUTO: 1.7 10E3/UL (ref 0.8–5.3)
LYMPHOCYTES NFR BLD AUTO: 27 %
MCH RBC QN AUTO: 28.6 PG (ref 26.5–33)
MCHC RBC AUTO-ENTMCNC: 33.7 G/DL (ref 31.5–36.5)
MCV RBC AUTO: 85 FL (ref 78–100)
MONOCYTES # BLD AUTO: 0.4 10E3/UL (ref 0–1.3)
MONOCYTES NFR BLD AUTO: 6 %
NEUTROPHILS # BLD AUTO: 4.1 10E3/UL (ref 1.6–8.3)
NEUTROPHILS NFR BLD AUTO: 66 %
PLATELET # BLD AUTO: 238 10E3/UL (ref 150–450)
POTASSIUM SERPL-SCNC: 3.9 MMOL/L (ref 3.4–5.3)
PROT SERPL-MCNC: 7.5 G/DL (ref 6.4–8.3)
RBC # BLD AUTO: 5 10E6/UL (ref 4.4–5.9)
SODIUM SERPL-SCNC: 141 MMOL/L (ref 135–145)
WBC # BLD AUTO: 6.2 10E3/UL (ref 4–11)

## 2023-12-23 PROCEDURE — 83690 ASSAY OF LIPASE: CPT | Performed by: FAMILY MEDICINE

## 2023-12-23 PROCEDURE — 85025 COMPLETE CBC W/AUTO DIFF WBC: CPT | Performed by: FAMILY MEDICINE

## 2023-12-23 PROCEDURE — 80053 COMPREHEN METABOLIC PANEL: CPT | Performed by: FAMILY MEDICINE

## 2023-12-23 PROCEDURE — 86140 C-REACTIVE PROTEIN: CPT | Performed by: FAMILY MEDICINE

## 2023-12-23 PROCEDURE — 99203 OFFICE O/P NEW LOW 30 MIN: CPT | Performed by: FAMILY MEDICINE

## 2023-12-23 PROCEDURE — 36415 COLL VENOUS BLD VENIPUNCTURE: CPT | Performed by: FAMILY MEDICINE

## 2023-12-23 PROCEDURE — 87338 HPYLORI STOOL AG IA: CPT | Performed by: FAMILY MEDICINE

## 2023-12-23 NOTE — PROGRESS NOTES
"  Assessment & Plan     LUQ abdominal pain  The patient has some degree of chronic left-sided discomfort with tenderness in the left upper quadrant on examination today.  I did not appreciate any masses.  Given the chronicity of the symptoms he is concerned about the possibility of cancer so we talked about that.  I have suggested doing some blood work today and he was okay doing that as well as repeating stool testing for H. pylori infection.  An order was also put in for a CT scan of the abdomen and pelvis which we could consider pursuing if there is any active disease with the blood work or symptoms or not resolving.  I felt this was probably the single best imaging to rule to rule out some of his underlying concerns.  - CBC with platelets and differential; Future  - Comprehensive metabolic panel (BMP + Alb, Alk Phos, ALT, AST, Total. Bili, TP); Future  - Helicobacter pylori Antigen Stool; Future  - CRP, inflammation; Future  - Lipase; Future  - CT Abdomen Pelvis w Contrast; Future  - CBC with platelets and differential  - Comprehensive metabolic panel (BMP + Alb, Alk Phos, ALT, AST, Total. Bili, TP)  - CRP, inflammation  - Lipase  - Helicobacter pylori Antigen Stool    Heartburn  He does have symptoms suggestive of some acid reflux and possibly esophagitis.  I recommended starting omeprazole 20 mg daily after he completes the H. pylori testing.  - omeprazole (PRILOSEC) 20 MG DR capsule; Take 1 capsule (20 mg) by mouth daily                 No follow-ups on file.    Chavo Palacios MD  [facility]    Marina Dumont is a 33 year old, presenting for the following health issues:  Urgent Care and Abdominal Pain (C/O stomach pain and nausea for 3 days)      HPI         Patient is here today accompanied by his girlfriend.  Approximately 8 to 10 years ago he was diagnosed with H. pylori after developing some chronic abdominal pain.  He took \"triple \"therapy but does not " recall exactly which medications.  It was discovered for workup of some pre-existing abdominal pain.  At this point in time he continues to have the same feeling of primarily pain along the left side of the abdomen as well as a feeling that air is trapped in his stomach.  He describes the pain as gnawing.  He endorses burning discomfort, particularly with consumption of acidic foods both retrosternal and in the upper abdomen.  Nausea for the last 3 days without emesis.  No blood in the stool.  No diarrhea.  Bowel movements are normal but only every other day.  He is concerned about weight loss over this timeframe.  He expressed a concern regarding cancer and/or incompletely treated H. Pylori.  he does not see a doctor regularly.      Review of Systems   Constitutional, HEENT, cardiovascular, pulmonary, gi and gu systems are negative, except as otherwise noted.      Objective    /72   Pulse 66   Temp 97.8  F (36.6  C) (Tympanic)   Wt 73.9 kg (163 lb)   SpO2 100%   BMI 23.06 kg/m    Body mass index is 23.06 kg/m .  Physical Exam   GENERAL: healthy, alert and no distress  EYES: Eyes grossly normal to inspection, PERRL and conjunctivae and sclerae normal  HENT: normal cephalic/atraumatic, ear canals and TM's normal, nose and mouth without ulcers or lesions, oropharynx clear, oral mucous membranes moist, and poor dentition  NECK: no adenopathy, no asymmetry, masses, or scars and thyroid normal to palpation  RESP: lungs clear to auscultation - no rales, rhonchi or wheezes  CV: regular rate and rhythm, normal S1 S2, no S3 or S4, no murmur, click or rub, no peripheral edema and peripheral pulses strong  ABDOMEN: tenderness LUQ, no organomegaly or masses, liver span normal to percussion, bowel sounds normal, and no palpable or pulsatile masses, no rebound or guarding   (male): normal male genitalia without lesions or urethral discharge, no hernia  MS: no gross musculoskeletal defects noted, no edema  SKIN: no  suspicious lesions or rashes  NEURO: Normal strength and tone, mentation intact and speech normal  PSYCH: mentation appears normal and affect flat

## 2023-12-23 NOTE — LETTER
December 23, 2023      Tim Santoyo  [address] [facility] [id number]        To Whom It May Concern:    Tim Santoyo was seen in our clinic on 12/23/2023. He may return to work without restrictions on 12/26/2023.      Sincerely,        Chavo Palacios MD

## 2023-12-26 LAB — H PYLORI AG STL QL IA: NEGATIVE

## 2024-07-21 ENCOUNTER — HEALTH MAINTENANCE LETTER (OUTPATIENT)
Age: 34
End: 2024-07-21

## 2025-08-10 ENCOUNTER — HEALTH MAINTENANCE LETTER (OUTPATIENT)
Age: 35
End: 2025-08-10